# Patient Record
Sex: MALE | Race: BLACK OR AFRICAN AMERICAN | NOT HISPANIC OR LATINO | Employment: STUDENT | URBAN - METROPOLITAN AREA
[De-identification: names, ages, dates, MRNs, and addresses within clinical notes are randomized per-mention and may not be internally consistent; named-entity substitution may affect disease eponyms.]

---

## 2024-03-20 ENCOUNTER — OFFICE VISIT (OUTPATIENT)
Dept: DERMATOLOGY | Facility: CLINIC | Age: 24
End: 2024-03-20
Payer: COMMERCIAL

## 2024-03-20 DIAGNOSIS — L63.9 ALOPECIA AREATA: Primary | ICD-10-CM

## 2024-03-20 PROCEDURE — 11901 INJECT SKIN LESIONS >7: CPT | Performed by: STUDENT IN AN ORGANIZED HEALTH CARE EDUCATION/TRAINING PROGRAM

## 2024-03-20 PROCEDURE — 99204 OFFICE O/P NEW MOD 45 MIN: CPT | Performed by: STUDENT IN AN ORGANIZED HEALTH CARE EDUCATION/TRAINING PROGRAM

## 2024-03-20 RX ORDER — CLOBETASOL PROPIONATE 0.5 MG/G
OINTMENT TOPICAL 2 TIMES DAILY
Qty: 60 G | Refills: 3 | Status: SHIPPED | OUTPATIENT
Start: 2024-03-20 | End: 2024-05-19

## 2024-03-20 ASSESSMENT — DERMATOLOGY PATIENT ASSESSMENT
DO YOU USE A TANNING BED: NO
ARE YOU AN ORGAN TRANSPLANT RECIPIENT: NO
DO YOU HAVE ANY NEW OR CHANGING LESIONS: NO
HAVE YOU HAD OR DO YOU HAVE VASCULAR DISEASE: NO
HAVE YOU HAD OR DO YOU HAVE A STAPH INFECTION: NO

## 2024-03-20 ASSESSMENT — DERMATOLOGY QUALITY OF LIFE (QOL) ASSESSMENT
RATE HOW BOTHERED YOU ARE BY SYMPTOMS OF YOUR SKIN PROBLEM (EG, ITCHING, STINGING BURNING, HURTING OR SKIN IRRITATION): 0 - NEVER BOTHERED
RATE HOW EMOTIONALLY BOTHERED YOU ARE BY YOUR SKIN PROBLEM (FOR EXAMPLE, WORRY, EMBARRASSMENT, FRUSTRATION): 0 - NEVER BOTHERED
ARE THERE EXCLUSIONS OR EXCEPTIONS FOR THE QUALITY OF LIFE ASSESSMENT: NO
DATE THE QUALITY-OF-LIFE ASSESSMENT WAS COMPLETED: 66919
RATE HOW BOTHERED YOU ARE BY EFFECTS OF YOUR SKIN PROBLEMS ON YOUR ACTIVITIES (EG, GOING OUT, ACCOMPLISHING WHAT YOU WANT, WORK ACTIVITIES OR YOUR RELATIONSHIPS WITH OTHERS): 0 - NEVER BOTHERED
WHAT SINGLE SKIN CONDITION LISTED BELOW IS THE PATIENT ANSWERING THE QUALITY-OF-LIFE ASSESSMENT QUESTIONS ABOUT: ALOPECIA

## 2024-03-20 ASSESSMENT — PATIENT GLOBAL ASSESSMENT (PGA): PATIENT GLOBAL ASSESSMENT: PATIENT GLOBAL ASSESSMENT:  2 - MILD

## 2024-03-20 ASSESSMENT — ITCH NUMERIC RATING SCALE: HOW SEVERE IS YOUR ITCHING?: 0

## 2024-03-20 NOTE — PROGRESS NOTES
Subjective     Zhao Haskins is a 23 y.o. male who presents for the following: Alopecia (Patient stated it started around 4 months ago. Had hair loss as teeenager). Patient states he used to go to [facility] as a child and was given a cream which resolved hair loss. Within the last year, he has had worsening round patches which have now encompassed the back of his scalp, with a new spot on his frontal scalp. He got injections from a dermatologist in [facility] 2 months ago and he thinks he had some improvement.     Review of Systems:  No other skin or systemic complaints other than what is documented elsewhere in the note.    The following portions of the chart were reviewed this encounter and updated as appropriate:          Skin Cancer History  No skin cancer on file.      Specialty Problems    None       Objective   Well appearing patient in no apparent distress; mood and affect are within normal limits.    A focused skin examination was performed. All findings within normal limits unless otherwise noted below.    Assessment/Plan   1. Alopecia areata (2)  Mid Frontal Scalp, Mid Parietal Scalp  Entire posterior scalp with loss of hair follicles, as well as a 2x2cm oval patch on the mid frontal scalp    -We reviewed the diagnosis of alopecia areata in detail with the patient.  Alopecia areata is an autoimmune condition that affects 0.1-0.2% of the population, and is characterized by the sudden appearance of sharply defined round or oval patches of hair loss.  Although the condition occurs at all ages, about a quarter to a half of all patients experience their first episode before 16 years of age.  In alopecia areata, the body's immune system, particularly the T lymphocytes begin to attack the hair follicle, leading to the onset of hair loss.  Although associated autoimmune disorders in affected children are rare, a family history of other autoimmune disorders, especially thyroiditis, can be seen.   -The course of  alopecia areata is variable and difficult to predict.  Spontaneous regrowth may occur.  In general, when the process is limited to a few patches, the prognosis is good, with complete regrowth occurring within 1 year in up to half of all patients, while a smaller portion will progress to total loss of scalp hair from which full recovery is unusual (<10%).  About 30% of patients overall will have future episodes of alopecia areata once regrown. The earlier the onset,the poorer the prognosis. Other prognostic indicators of a worse outcome are family history of autoimmune disease, personal history of atopy, and nail abnormalities.   -Therapy for alopecia areata is aimed at improving hair regrowth, but does not cure the condition or prevent development of new areas.  Treatment options often include topical or intralesional kenalog, which is considered for a limited amount of disease.  Newer therapies include either topical or systemic KALYANI inhibitors which have shown impressive results.   -Plan for intralesional kenalog injections today.  Reviewed side effects of ILK including atrophy, dyspigmentation.  Discussed may require a series of treatments in order to obtain optimal effects.     -START clobetasol 0.05% ointment BID for 2 months as well to all areas of hair loss  -On follow up, plan to re-evaluate and consider Baricitinib and/or oral minoxidil    Intralesional injection - Mid Frontal Scalp, Mid Parietal Scalp  Intralesional Injection:   Date/Time: 3/20/2024 10:08 AM    Consent:     Consent obtained:  Verbal    Consent given by:  Patient    Risks discussed:  Poor cosmetic result, pain, infection and bleeding    Alternatives discussed:  No treatment  Pre-Procedure Details:     Prep Type:  Isopropyl alcohol  Procedure Details:   Injection:  Triamcinolone  Outcome: patient tolerated procedure well  Post-procedure details: sterile dressing applied and wound care instructions given  Dressing type: bandage   Comments:  A  total of 3 ml of 5 mg/mL Kenalog were injected into entire posterior scalp    Related Medications  clobetasol (Temovate) 0.05 % ointment  Apply topically 2 times a day.    triamcinolone acetonide (Kenalog) injection 5 mg      Slade Canales MD  PGY-4 Dermatology    RTC in 2 months    I was present during all portions of visit and supervised resident directly   Including any discussion and performing of procedures as well as medical management and follow up care

## 2024-05-15 ENCOUNTER — OFFICE VISIT (OUTPATIENT)
Dept: DERMATOLOGY | Facility: CLINIC | Age: 24
End: 2024-05-15
Payer: COMMERCIAL

## 2024-05-15 DIAGNOSIS — L63.9 ALOPECIA AREATA: Primary | ICD-10-CM

## 2024-05-15 DIAGNOSIS — L81.9 HYPOPIGMENTATION: ICD-10-CM

## 2024-05-15 PROCEDURE — 99214 OFFICE O/P EST MOD 30 MIN: CPT | Performed by: STUDENT IN AN ORGANIZED HEALTH CARE EDUCATION/TRAINING PROGRAM

## 2024-05-15 RX ORDER — KETOCONAZOLE 20 MG/G
CREAM TOPICAL
Qty: 60 G | Refills: 11 | Status: SHIPPED | OUTPATIENT
Start: 2024-05-15

## 2024-05-15 ASSESSMENT — DERMATOLOGY PATIENT ASSESSMENT
ARE YOU AN ORGAN TRANSPLANT RECIPIENT: NO
HAVE YOU HAD OR DO YOU HAVE VASCULAR DISEASE: NO
DO YOU USE A TANNING BED: NO
HAVE YOU HAD OR DO YOU HAVE A STAPH INFECTION: NO

## 2024-05-15 ASSESSMENT — DERMATOLOGY QUALITY OF LIFE (QOL) ASSESSMENT
RATE HOW BOTHERED YOU ARE BY EFFECTS OF YOUR SKIN PROBLEMS ON YOUR ACTIVITIES (EG, GOING OUT, ACCOMPLISHING WHAT YOU WANT, WORK ACTIVITIES OR YOUR RELATIONSHIPS WITH OTHERS): 0 - NEVER BOTHERED
RATE HOW EMOTIONALLY BOTHERED YOU ARE BY YOUR SKIN PROBLEM (FOR EXAMPLE, WORRY, EMBARRASSMENT, FRUSTRATION): 0 - NEVER BOTHERED
RATE HOW BOTHERED YOU ARE BY SYMPTOMS OF YOUR SKIN PROBLEM (EG, ITCHING, STINGING BURNING, HURTING OR SKIN IRRITATION): 0 - NEVER BOTHERED
WHAT SINGLE SKIN CONDITION LISTED BELOW IS THE PATIENT ANSWERING THE QUALITY-OF-LIFE ASSESSMENT QUESTIONS ABOUT: ALOPECIA
DATE THE QUALITY-OF-LIFE ASSESSMENT WAS COMPLETED: 66975
ARE THERE EXCLUSIONS OR EXCEPTIONS FOR THE QUALITY OF LIFE ASSESSMENT: NO

## 2024-05-15 ASSESSMENT — ITCH NUMERIC RATING SCALE: HOW SEVERE IS YOUR ITCHING?: 0

## 2024-05-15 ASSESSMENT — PATIENT GLOBAL ASSESSMENT (PGA): PATIENT GLOBAL ASSESSMENT: PATIENT GLOBAL ASSESSMENT:  1 - CLEAR

## 2024-05-15 NOTE — PATIENT INSTRUCTIONS
See me in 3month  Start ketoconazole for your face (its cheap and your regular pharmacy will have it)    I am also trying to get you approved on a more special medication, this may take a few days  Its called opzelura. Its mostly for your scalp. You can use it once daily.  If you are using it on your face, just a tiny drop every few days.    In 3month I want to see if I regrow the hair with the cream

## 2024-05-15 NOTE — PROGRESS NOTES
Subjective     Zhao Haskins is a 23 y.o. male who presents for the following: Alopecia (Hair loss been present since he was a freshman in high school) and Skin Discoloration (face).     Review of Systems:  No other skin or systemic complaints other than what is documented elsewhere in the note.    The following portions of the chart were reviewed this encounter and updated as appropriate:          Skin Cancer History  No skin cancer on file.      Specialty Problems    None       Objective   Well appearing patient in no apparent distress; mood and affect are within normal limits.    A focused skin examination was performed. All findings within normal limits unless otherwise noted below.    Assessment/Plan   1. Alopecia areata  Diffuse alopecic patches onposterior scalp (occipital) with few more alopecic patches on frontal scalp, at least 40% of scalp is affected  Related Medications  clobetasol (Temovate) 0.05 % ointment  Apply topically 2 times a day.    ruxolitinib (Opzelura) 1.5 % cream cream  Apply 1 Application topically 2 times a day.    Stop using the clobetasol    He declines systemic cholo inhibitors for the time being    2. Hypopigmentation  Scalp  Hypopigmented patches on forehead/ occipital scalp, hypopigmented macules on cheeks/malarcheeks,   I wonder how much of the hypopigmentation is from clobetasol? He said he was not using it onforhead,however  He denies any involvement of other body parts with vitiligo or hypopigmentation elsewhere  Will try ketoconazole cream for face and re-eval response in 2month  He can use opzelura cream on forehead as well sparingly, but opzelura is mostly for the scalp    Considering whether variant of seborrheic dermatitis? Doubt hypopigmented vitiligo but this can be considered as well

## 2024-05-16 ENCOUNTER — SPECIALTY PHARMACY (OUTPATIENT)
Dept: PHARMACY | Facility: CLINIC | Age: 24
End: 2024-05-16

## 2024-06-06 ENCOUNTER — TELEPHONE (OUTPATIENT)
Dept: DERMATOLOGY | Facility: CLINIC | Age: 24
End: 2024-06-06
Payer: COMMERCIAL

## 2024-06-06 NOTE — TELEPHONE ENCOUNTER
Pt left message yesterday wanting to know what is the status of his medication Opzelura cr ? He has been waiting since 05/15/24 and does not know if been approved .. Went to  pharmacy ..

## 2024-08-21 ENCOUNTER — APPOINTMENT (OUTPATIENT)
Dept: DERMATOLOGY | Facility: CLINIC | Age: 24
End: 2024-08-21
Payer: COMMERCIAL

## 2024-10-07 ENCOUNTER — HOSPITAL ENCOUNTER (OUTPATIENT)
Dept: RADIOLOGY | Facility: CLINIC | Age: 24
Discharge: HOME | End: 2024-10-07
Payer: COMMERCIAL

## 2024-10-07 DIAGNOSIS — M79.642 LEFT HAND PAIN: ICD-10-CM

## 2024-10-07 DIAGNOSIS — M79.644 FINGER PAIN, RIGHT: ICD-10-CM

## 2024-10-07 DIAGNOSIS — M79.645 PAIN IN LEFT FINGER(S): ICD-10-CM

## 2024-10-07 PROCEDURE — 73140 X-RAY EXAM OF FINGER(S): CPT | Mod: LEFT SIDE | Performed by: RADIOLOGY

## 2024-10-07 PROCEDURE — 73140 X-RAY EXAM OF FINGER(S): CPT | Mod: LT

## 2024-10-19 ENCOUNTER — TELEPHONE (OUTPATIENT)
Dept: ORTHOPEDIC SURGERY | Facility: HOSPITAL | Age: 24
End: 2024-10-19
Payer: COMMERCIAL

## 2024-10-19 DIAGNOSIS — S63.259A FINGER DISLOCATION, INITIAL ENCOUNTER: Primary | ICD-10-CM

## 2024-10-20 ENCOUNTER — APPOINTMENT (OUTPATIENT)
Dept: RADIOLOGY | Facility: HOSPITAL | Age: 24
End: 2024-10-20
Payer: COMMERCIAL

## 2024-10-20 ENCOUNTER — HOSPITAL ENCOUNTER (EMERGENCY)
Facility: HOSPITAL | Age: 24
Discharge: HOME | End: 2024-10-20
Payer: COMMERCIAL

## 2024-10-20 VITALS
BODY MASS INDEX: 32.85 KG/M2 | TEMPERATURE: 97.5 F | SYSTOLIC BLOOD PRESSURE: 140 MMHG | DIASTOLIC BLOOD PRESSURE: 79 MMHG | WEIGHT: 242.51 LBS | OXYGEN SATURATION: 97 % | HEIGHT: 72 IN | RESPIRATION RATE: 18 BRPM | HEART RATE: 67 BPM

## 2024-10-20 DIAGNOSIS — S63.654A SPRAIN OF METACARPOPHALANGEAL (MCP) JOINT OF RIGHT RING FINGER, INITIAL ENCOUNTER: Primary | ICD-10-CM

## 2024-10-20 PROCEDURE — 73140 X-RAY EXAM OF FINGER(S): CPT | Mod: RIGHT SIDE

## 2024-10-20 PROCEDURE — 73140 X-RAY EXAM OF FINGER(S): CPT | Mod: RT

## 2024-10-20 PROCEDURE — 99283 EMERGENCY DEPT VISIT LOW MDM: CPT

## 2024-10-20 ASSESSMENT — PAIN SCALES - GENERAL: PAINLEVEL_OUTOF10: 7

## 2024-10-20 ASSESSMENT — PAIN DESCRIPTION - PROGRESSION: CLINICAL_PROGRESSION: NOT CHANGED

## 2024-10-20 ASSESSMENT — COLUMBIA-SUICIDE SEVERITY RATING SCALE - C-SSRS
2. HAVE YOU ACTUALLY HAD ANY THOUGHTS OF KILLING YOURSELF?: NO
6. HAVE YOU EVER DONE ANYTHING, STARTED TO DO ANYTHING, OR PREPARED TO DO ANYTHING TO END YOUR LIFE?: NO
1. IN THE PAST MONTH, HAVE YOU WISHED YOU WERE DEAD OR WISHED YOU COULD GO TO SLEEP AND NOT WAKE UP?: NO

## 2024-10-20 ASSESSMENT — PAIN DESCRIPTION - FREQUENCY: FREQUENCY: CONSTANT/CONTINUOUS

## 2024-10-20 ASSESSMENT — PAIN DESCRIPTION - ONSET: ONSET: AWAKENED FROM SLEEP

## 2024-10-20 ASSESSMENT — PAIN DESCRIPTION - ORIENTATION: ORIENTATION: RIGHT

## 2024-10-20 ASSESSMENT — PAIN - FUNCTIONAL ASSESSMENT: PAIN_FUNCTIONAL_ASSESSMENT: 0-10

## 2024-10-20 NOTE — ED PROVIDER NOTES
HPI   Chief Complaint   Patient presents with    Hand Pain     Right ring finger dislocation yesterday, was put back in place and now is swollen and painful       HPI  Patient is a 24-year-old male who presents to ED for right ring finger pain.  Patient states his finger was dislocated yesterday but was reduced by his .  Patient is a football player.  Patient reports swelling and pain would like to be evaluated for possible fracture.  No other acute complaints.      Patient History   No past medical history on file.  No past surgical history on file.  No family history on file.  Social History     Tobacco Use    Smoking status: Not on file    Smokeless tobacco: Not on file   Substance Use Topics    Alcohol use: Not on file    Drug use: Not on file       Physical Exam   ED Triage Vitals   Temperature Heart Rate Respirations BP   10/20/24 1057 10/20/24 1057 10/20/24 1057 10/20/24 1100   36.4 °C (97.5 °F) 67 18 140/79      Pulse Ox Temp Source Heart Rate Source Patient Position   10/20/24 1057 10/20/24 1057 10/20/24 1057 10/20/24 1057   97 % Oral Monitor Sitting      BP Location FiO2 (%)     10/20/24 1057 --     Right arm        Physical Exam  Vitals reviewed.   Constitutional:       General: He is not in acute distress.     Appearance: Normal appearance. He is not ill-appearing.   HENT:      Head: Normocephalic and atraumatic.   Eyes:      Extraocular Movements: Extraocular movements intact.   Cardiovascular:      Rate and Rhythm: Normal rate.   Pulmonary:      Effort: Pulmonary effort is normal.   Abdominal:      General: Abdomen is flat.   Musculoskeletal:         General: Normal range of motion.      Right hand: Swelling present.      Cervical back: Neck supple.   Skin:     General: Skin is warm and dry.   Neurological:      Mental Status: He is alert and oriented to person, place, and time.   Psychiatric:         Mood and Affect: Mood normal.         Behavior: Behavior normal.           ED Course & MDM    Diagnoses as of 10/20/24 1317   Sprain of metacarpophalangeal (MCP) joint of right ring finger, initial encounter                 No data recorded     Ruperto Coma Scale Score: 15 (10/20/24 1111 : Katelyn Tapia RN)                           Medical Decision Making  Parts of this chart have been completed using voice recognition software. Please excuse any errors of transcription.  My thought process and reason for plan has been formulated from the time that I saw the patient until the time of disposition and is not specific to one specific moment during their visit and furthermore my MDM encompasses this entire chart and not only this text box.    HPI:   Detailed above.    Exam:   A medically appropriate exam performed, outlined above, given the known history and presentation.    History obtained from:   Patient    EKG/Cardiac monitor:     Social Determinants of Health considered during this visit:     Medications given during visit:  Medications - No data to display     Diagnostic/tests:  Labs Reviewed - No data to display   XR fingers right 2+ views   Final Result   No acute radiographic abnormality             MACRO:   None        Signed by: Therese Nowak 10/20/2024 12:33 PM   Dictation workstation:   [id number]           MDM Summary:    We have discussed the diagnosis and risks, and we agree with discharging home to follow-up with appropriate physician as directed. We also discussed returning to the Emergency Department immediately if new or worsening symptoms occur. We have discussed the symptoms which are most concerning that necessitate immediate return. Pt symptoms have been well controlled here and the patient is safe for discharge with appropriate outpatient follow up. The patient has verbalized understanding to return to ER without delay for new or worsening pains or for any other symptoms or concerns. I utilized the discharge clinical management tool provided [employer] to help  estimate risk of negative outcome for this patient.        Procedure  Procedures     Braxton Chairez PA-C  10/20/24 6784

## 2024-10-21 NOTE — TELEPHONE ENCOUNTER
10/19/2024 football game, right ring finger dislocation reduced by . He is right hand dominant. He is neurovascular intact, full range of motion of ring finger, 4+/5 strength of ring finger due to pain. Pablo taped and sent him to get post reduction images.    Porfirio Zuñiga MD  Primary Care Sports Medicine Fellow  [facility]  [facility]

## 2025-03-18 ENCOUNTER — APPOINTMENT (OUTPATIENT)
Dept: DERMATOLOGY | Facility: CLINIC | Age: 25
End: 2025-03-18
Payer: COMMERCIAL

## 2025-04-14 ENCOUNTER — APPOINTMENT (OUTPATIENT)
Dept: DERMATOLOGY | Facility: CLINIC | Age: 25
End: 2025-04-14
Payer: COMMERCIAL

## 2025-07-03 ENCOUNTER — APPOINTMENT (OUTPATIENT)
Dept: DERMATOLOGY | Facility: CLINIC | Age: 25
End: 2025-07-03
Payer: COMMERCIAL

## 2025-10-09 ENCOUNTER — APPOINTMENT (OUTPATIENT)
Dept: DERMATOLOGY | Facility: CLINIC | Age: 25
End: 2025-10-09
Payer: COMMERCIAL